# Patient Record
Sex: MALE | Race: ASIAN | NOT HISPANIC OR LATINO
[De-identification: names, ages, dates, MRNs, and addresses within clinical notes are randomized per-mention and may not be internally consistent; named-entity substitution may affect disease eponyms.]

---

## 2022-04-12 ENCOUNTER — APPOINTMENT (OUTPATIENT)
Dept: OTOLARYNGOLOGY | Facility: CLINIC | Age: 67
End: 2022-04-12
Payer: MEDICARE

## 2022-04-12 VITALS
WEIGHT: 166 LBS | DIASTOLIC BLOOD PRESSURE: 85 MMHG | BODY MASS INDEX: 25.16 KG/M2 | SYSTOLIC BLOOD PRESSURE: 157 MMHG | HEART RATE: 74 BPM | TEMPERATURE: 97.8 F | HEIGHT: 68 IN

## 2022-04-12 DIAGNOSIS — Z87.39 PERSONAL HISTORY OF OTHER DISEASES OF THE MUSCULOSKELETAL SYSTEM AND CONNECTIVE TISSUE: ICD-10-CM

## 2022-04-12 DIAGNOSIS — Z86.39 PERSONAL HISTORY OF OTHER ENDOCRINE, NUTRITIONAL AND METABOLIC DISEASE: ICD-10-CM

## 2022-04-12 DIAGNOSIS — H90.3 SENSORINEURAL HEARING LOSS, BILATERAL: ICD-10-CM

## 2022-04-12 DIAGNOSIS — R94.39 ABNORMAL RESULT OF OTHER CARDIOVASCULAR FUNCTION STUDY: ICD-10-CM

## 2022-04-12 PROCEDURE — 92557 COMPREHENSIVE HEARING TEST: CPT

## 2022-04-12 PROCEDURE — 99204 OFFICE O/P NEW MOD 45 MIN: CPT | Mod: 25

## 2022-04-12 PROCEDURE — 31231 NASAL ENDOSCOPY DX: CPT

## 2022-04-12 PROCEDURE — G0268 REMOVAL OF IMPACTED WAX MD: CPT

## 2022-04-12 PROCEDURE — 92567 TYMPANOMETRY: CPT

## 2022-04-12 RX ORDER — BRIMONIDINE TARTRATE, TIMOLOL MALEATE 2; 5 MG/ML; MG/ML
SOLUTION/ DROPS OPHTHALMIC
Refills: 0 | Status: ACTIVE | COMMUNITY

## 2022-04-12 RX ORDER — METFORMIN HYDROCHLORIDE 500 MG/1
500 TABLET, COATED ORAL
Refills: 0 | Status: ACTIVE | COMMUNITY

## 2022-04-12 RX ORDER — ASPIRIN 81 MG
81 TABLET, DELAYED RELEASE (ENTERIC COATED) ORAL
Refills: 0 | Status: ACTIVE | COMMUNITY

## 2022-04-12 RX ORDER — CLOPIDOGREL BISULFATE 75 MG/1
75 TABLET, FILM COATED ORAL
Refills: 0 | Status: ACTIVE | COMMUNITY

## 2022-04-12 RX ORDER — CARVEDILOL 12.5 MG/1
12.5 TABLET, FILM COATED ORAL
Refills: 0 | Status: ACTIVE | COMMUNITY

## 2022-04-12 RX ORDER — ALLOPURINOL 100 MG/1
100 TABLET ORAL
Refills: 0 | Status: ACTIVE | COMMUNITY

## 2022-04-12 RX ORDER — LOSARTAN POTASSIUM 100 MG/1
100 TABLET, FILM COATED ORAL
Refills: 0 | Status: ACTIVE | COMMUNITY

## 2022-04-12 RX ORDER — ATORVASTATIN CALCIUM 40 MG/1
40 TABLET, FILM COATED ORAL
Refills: 0 | Status: ACTIVE | COMMUNITY

## 2022-04-12 NOTE — ASSESSMENT
[FreeTextEntry1] : With a long history of an asymmetrical hearing loss in the right ear had an MRI in the past ruled out any acoustic neuromas.  Comes in because he is clogged bilaterally.  He is diabetic on blood thinners.  On examination massive cerumen impaction curetted out endoscopically as a deviated septum no tumors masses or polyps audiogram confirms asymmetry no further acute interventions indicated do recommend follow-up as needed.

## 2022-04-12 NOTE — HISTORY OF PRESENT ILLNESS
[de-identified] : Patient has a long history of hearing loss in the right ear. he had an MRI done that showed no abnormality as far as he knows. He was told that he has wax in both ears. He does not have any ringing in the ears, dizziness, and no pain. He does not have any issues with nasal congestion or runny nose.

## 2022-04-12 NOTE — END OF VISIT
[FreeTextEntry3] : I saw and examined this patient in person. I have discussed with Elham Meza, Physician Assistant, in detail the above note and agree with the above assessment and plan of care.\par

## 2023-04-27 ENCOUNTER — APPOINTMENT (OUTPATIENT)
Dept: OTOLARYNGOLOGY | Facility: CLINIC | Age: 68
End: 2023-04-27
Payer: MEDICARE

## 2023-04-27 DIAGNOSIS — H61.23 IMPACTED CERUMEN, BILATERAL: ICD-10-CM

## 2023-04-27 DIAGNOSIS — J34.2 DEVIATED NASAL SEPTUM: ICD-10-CM

## 2023-04-27 DIAGNOSIS — H90.3 SENSORINEURAL HEARING LOSS, BILATERAL: ICD-10-CM

## 2023-04-27 PROCEDURE — 92567 TYMPANOMETRY: CPT

## 2023-04-27 PROCEDURE — 92557 COMPREHENSIVE HEARING TEST: CPT

## 2023-04-27 PROCEDURE — G0268 REMOVAL OF IMPACTED WAX MD: CPT

## 2023-04-27 PROCEDURE — 99214 OFFICE O/P EST MOD 30 MIN: CPT | Mod: 25

## 2023-04-27 NOTE — HISTORY OF PRESENT ILLNESS
[de-identified] : patient has a known history of a right ear hearing loss, comes in for another ear check. He does not have any noted changes in hearing or ringing in the ears. He denies dizziness. He does not have any acute issues with nasal congestion and runny nose. He denies seasonal allergies this time of y ear \par \par He has had an MRI in the past for the asymmetrical hearing loss that was normal

## 2023-04-27 NOTE — PHYSICAL EXAM
[Midline] : trachea located in midline position [Normal] : no rashes [de-identified] : cerumen in the ear canals; once removed normal EACs

## 2023-04-27 NOTE — END OF VISIT
[FreeTextEntry3] : I, Dr. Girard personally performed the evaluation and management (E/M) services , including all procedures, for this established patient who presents today with (a) new problem(s)/exacerbation of (an) existing condition(s). That E/M includes conducting the clinically appropriate interval history &/or exam, assessing all new/exacerbated conditions, and establishing a new plan of care. Today, my SHY, Elham Meza, was here to observe &/or participate in the visit & follow plan of care established by me.\par \par \par

## 2023-04-27 NOTE — ASSESSMENT
[FreeTextEntry1] : History of asymmetrical hearing loss had an MRI comes in for cerumen management significant amount of wax curetted out bilaterally audiogram was performed everything seems to be stable we will follow-up annually.  Audiogram no change

## 2024-05-09 ENCOUNTER — INPATIENT (INPATIENT)
Facility: HOSPITAL | Age: 69
LOS: 0 days | Discharge: HOME | End: 2024-05-10
Attending: INTERNAL MEDICINE | Admitting: INTERNAL MEDICINE
Payer: MEDICARE

## 2024-05-09 VITALS
RESPIRATION RATE: 18 BRPM | SYSTOLIC BLOOD PRESSURE: 154 MMHG | TEMPERATURE: 98 F | OXYGEN SATURATION: 98 % | HEART RATE: 69 BPM | DIASTOLIC BLOOD PRESSURE: 67 MMHG

## 2024-05-09 DIAGNOSIS — R07.9 CHEST PAIN, UNSPECIFIED: ICD-10-CM

## 2024-05-09 DIAGNOSIS — N17.9 ACUTE KIDNEY FAILURE, UNSPECIFIED: ICD-10-CM

## 2024-05-09 DIAGNOSIS — I10 ESSENTIAL (PRIMARY) HYPERTENSION: ICD-10-CM

## 2024-05-09 DIAGNOSIS — Z29.9 ENCOUNTER FOR PROPHYLACTIC MEASURES, UNSPECIFIED: ICD-10-CM

## 2024-05-09 DIAGNOSIS — E11.9 TYPE 2 DIABETES MELLITUS WITHOUT COMPLICATIONS: ICD-10-CM

## 2024-05-09 DIAGNOSIS — Z90.5 ACQUIRED ABSENCE OF KIDNEY: Chronic | ICD-10-CM

## 2024-05-09 LAB
ALBUMIN SERPL ELPH-MCNC: 4.2 G/DL — SIGNIFICANT CHANGE UP (ref 3.3–5)
ALP SERPL-CCNC: 98 U/L — SIGNIFICANT CHANGE UP (ref 40–120)
ALT FLD-CCNC: 17 U/L — SIGNIFICANT CHANGE UP (ref 4–41)
ANION GAP SERPL CALC-SCNC: 15 MMOL/L — HIGH (ref 7–14)
APTT BLD: 33.9 SEC — SIGNIFICANT CHANGE UP (ref 24.5–35.6)
AST SERPL-CCNC: 23 U/L — SIGNIFICANT CHANGE UP (ref 4–40)
BASOPHILS # BLD AUTO: 0.06 K/UL — SIGNIFICANT CHANGE UP (ref 0–0.2)
BASOPHILS NFR BLD AUTO: 1.1 % — SIGNIFICANT CHANGE UP (ref 0–2)
BILIRUB SERPL-MCNC: 0.9 MG/DL — SIGNIFICANT CHANGE UP (ref 0.2–1.2)
BUN SERPL-MCNC: 45 MG/DL — HIGH (ref 7–23)
CALCIUM SERPL-MCNC: 9.1 MG/DL — SIGNIFICANT CHANGE UP (ref 8.4–10.5)
CHLORIDE SERPL-SCNC: 99 MMOL/L — SIGNIFICANT CHANGE UP (ref 98–107)
CO2 SERPL-SCNC: 22 MMOL/L — SIGNIFICANT CHANGE UP (ref 22–31)
CREAT SERPL-MCNC: 1.96 MG/DL — HIGH (ref 0.5–1.3)
EGFR: 36 ML/MIN/1.73M2 — LOW
EOSINOPHIL # BLD AUTO: 0.33 K/UL — SIGNIFICANT CHANGE UP (ref 0–0.5)
EOSINOPHIL NFR BLD AUTO: 5.9 % — SIGNIFICANT CHANGE UP (ref 0–6)
GLUCOSE BLDC GLUCOMTR-MCNC: 109 MG/DL — HIGH (ref 70–99)
GLUCOSE BLDC GLUCOMTR-MCNC: 132 MG/DL — HIGH (ref 70–99)
GLUCOSE SERPL-MCNC: 133 MG/DL — HIGH (ref 70–99)
HCT VFR BLD CALC: 46.6 % — SIGNIFICANT CHANGE UP (ref 39–50)
HGB BLD-MCNC: 15 G/DL — SIGNIFICANT CHANGE UP (ref 13–17)
IANC: 2.95 K/UL — SIGNIFICANT CHANGE UP (ref 1.8–7.4)
IMM GRANULOCYTES NFR BLD AUTO: 0.2 % — SIGNIFICANT CHANGE UP (ref 0–0.9)
INR BLD: 0.97 RATIO — SIGNIFICANT CHANGE UP (ref 0.85–1.18)
LYMPHOCYTES # BLD AUTO: 1.78 K/UL — SIGNIFICANT CHANGE UP (ref 1–3.3)
LYMPHOCYTES # BLD AUTO: 31.6 % — SIGNIFICANT CHANGE UP (ref 13–44)
MCHC RBC-ENTMCNC: 26.9 PG — LOW (ref 27–34)
MCHC RBC-ENTMCNC: 32.2 GM/DL — SIGNIFICANT CHANGE UP (ref 32–36)
MCV RBC AUTO: 83.7 FL — SIGNIFICANT CHANGE UP (ref 80–100)
MONOCYTES # BLD AUTO: 0.5 K/UL — SIGNIFICANT CHANGE UP (ref 0–0.9)
MONOCYTES NFR BLD AUTO: 8.9 % — SIGNIFICANT CHANGE UP (ref 2–14)
NEUTROPHILS # BLD AUTO: 2.95 K/UL — SIGNIFICANT CHANGE UP (ref 1.8–7.4)
NEUTROPHILS NFR BLD AUTO: 52.3 % — SIGNIFICANT CHANGE UP (ref 43–77)
NRBC # BLD: 0 /100 WBCS — SIGNIFICANT CHANGE UP (ref 0–0)
NRBC # FLD: 0 K/UL — SIGNIFICANT CHANGE UP (ref 0–0)
PLATELET # BLD AUTO: 171 K/UL — SIGNIFICANT CHANGE UP (ref 150–400)
POTASSIUM SERPL-MCNC: 5.1 MMOL/L — SIGNIFICANT CHANGE UP (ref 3.5–5.3)
POTASSIUM SERPL-SCNC: 5.1 MMOL/L — SIGNIFICANT CHANGE UP (ref 3.5–5.3)
PROT SERPL-MCNC: 7.6 G/DL — SIGNIFICANT CHANGE UP (ref 6–8.3)
PROTHROM AB SERPL-ACNC: 10.9 SEC — SIGNIFICANT CHANGE UP (ref 9.5–13)
RBC # BLD: 5.57 M/UL — SIGNIFICANT CHANGE UP (ref 4.2–5.8)
RBC # FLD: 14.5 % — SIGNIFICANT CHANGE UP (ref 10.3–14.5)
SODIUM SERPL-SCNC: 136 MMOL/L — SIGNIFICANT CHANGE UP (ref 135–145)
TROPONIN T, HIGH SENSITIVITY RESULT: 13 NG/L — SIGNIFICANT CHANGE UP
TROPONIN T, HIGH SENSITIVITY RESULT: 14 NG/L — SIGNIFICANT CHANGE UP
WBC # BLD: 5.63 K/UL — SIGNIFICANT CHANGE UP (ref 3.8–10.5)
WBC # FLD AUTO: 5.63 K/UL — SIGNIFICANT CHANGE UP (ref 3.8–10.5)

## 2024-05-09 PROCEDURE — 99285 EMERGENCY DEPT VISIT HI MDM: CPT

## 2024-05-09 PROCEDURE — 99223 1ST HOSP IP/OBS HIGH 75: CPT

## 2024-05-09 PROCEDURE — 71046 X-RAY EXAM CHEST 2 VIEWS: CPT | Mod: 26

## 2024-05-09 RX ORDER — INSULIN LISPRO 100/ML
VIAL (ML) SUBCUTANEOUS AT BEDTIME
Refills: 0 | Status: DISCONTINUED | OUTPATIENT
Start: 2024-05-09 | End: 2024-05-10

## 2024-05-09 RX ORDER — ACETAMINOPHEN 500 MG
650 TABLET ORAL EVERY 6 HOURS
Refills: 0 | Status: DISCONTINUED | OUTPATIENT
Start: 2024-05-09 | End: 2024-05-10

## 2024-05-09 RX ORDER — HEPARIN SODIUM 5000 [USP'U]/ML
5000 INJECTION INTRAVENOUS; SUBCUTANEOUS EVERY 12 HOURS
Refills: 0 | Status: DISCONTINUED | OUTPATIENT
Start: 2024-05-09 | End: 2024-05-10

## 2024-05-09 RX ORDER — ASPIRIN/CALCIUM CARB/MAGNESIUM 324 MG
1 TABLET ORAL
Refills: 0 | DISCHARGE

## 2024-05-09 RX ORDER — EMPAGLIFLOZIN 10 MG/1
1 TABLET, FILM COATED ORAL
Refills: 0 | DISCHARGE

## 2024-05-09 RX ORDER — GLUCAGON INJECTION, SOLUTION 0.5 MG/.1ML
1 INJECTION, SOLUTION SUBCUTANEOUS ONCE
Refills: 0 | Status: DISCONTINUED | OUTPATIENT
Start: 2024-05-09 | End: 2024-05-10

## 2024-05-09 RX ORDER — ASPIRIN/CALCIUM CARB/MAGNESIUM 324 MG
162 TABLET ORAL ONCE
Refills: 0 | Status: COMPLETED | OUTPATIENT
Start: 2024-05-09 | End: 2024-05-09

## 2024-05-09 RX ORDER — SODIUM CHLORIDE 9 MG/ML
1000 INJECTION, SOLUTION INTRAVENOUS
Refills: 0 | Status: DISCONTINUED | OUTPATIENT
Start: 2024-05-09 | End: 2024-05-10

## 2024-05-09 RX ORDER — FINERENONE 20 MG/1
1 TABLET, FILM COATED ORAL
Refills: 0 | DISCHARGE

## 2024-05-09 RX ORDER — ALLOPURINOL 300 MG
100 TABLET ORAL DAILY
Refills: 0 | Status: DISCONTINUED | OUTPATIENT
Start: 2024-05-09 | End: 2024-05-10

## 2024-05-09 RX ORDER — LOSARTAN POTASSIUM 100 MG/1
1 TABLET, FILM COATED ORAL
Refills: 0 | DISCHARGE

## 2024-05-09 RX ORDER — DEXTROSE 10 % IN WATER 10 %
125 INTRAVENOUS SOLUTION INTRAVENOUS ONCE
Refills: 0 | Status: DISCONTINUED | OUTPATIENT
Start: 2024-05-09 | End: 2024-05-10

## 2024-05-09 RX ORDER — LANOLIN ALCOHOL/MO/W.PET/CERES
3 CREAM (GRAM) TOPICAL AT BEDTIME
Refills: 0 | Status: DISCONTINUED | OUTPATIENT
Start: 2024-05-09 | End: 2024-05-10

## 2024-05-09 RX ORDER — DEXTROSE 50 % IN WATER 50 %
25 SYRINGE (ML) INTRAVENOUS ONCE
Refills: 0 | Status: DISCONTINUED | OUTPATIENT
Start: 2024-05-09 | End: 2024-05-10

## 2024-05-09 RX ORDER — ALLOPURINOL 300 MG
1 TABLET ORAL
Refills: 0 | DISCHARGE

## 2024-05-09 RX ORDER — TIMOLOL 0.5 %
1 DROPS OPHTHALMIC (EYE)
Refills: 0 | DISCHARGE

## 2024-05-09 RX ORDER — ASPIRIN/CALCIUM CARB/MAGNESIUM 324 MG
81 TABLET ORAL DAILY
Refills: 0 | Status: DISCONTINUED | OUTPATIENT
Start: 2024-05-09 | End: 2024-05-10

## 2024-05-09 RX ORDER — CARVEDILOL PHOSPHATE 80 MG/1
6.25 CAPSULE, EXTENDED RELEASE ORAL EVERY 12 HOURS
Refills: 0 | Status: DISCONTINUED | OUTPATIENT
Start: 2024-05-09 | End: 2024-05-10

## 2024-05-09 RX ORDER — CLOPIDOGREL BISULFATE 75 MG/1
75 TABLET, FILM COATED ORAL DAILY
Refills: 0 | Status: DISCONTINUED | OUTPATIENT
Start: 2024-05-09 | End: 2024-05-10

## 2024-05-09 RX ORDER — INSULIN LISPRO 100/ML
VIAL (ML) SUBCUTANEOUS
Refills: 0 | Status: DISCONTINUED | OUTPATIENT
Start: 2024-05-09 | End: 2024-05-10

## 2024-05-09 RX ORDER — BRIMONIDINE TARTRATE 2 MG/MG
1 SOLUTION/ DROPS OPHTHALMIC
Refills: 0 | DISCHARGE

## 2024-05-09 RX ORDER — CARVEDILOL PHOSPHATE 80 MG/1
1 CAPSULE, EXTENDED RELEASE ORAL
Refills: 0 | DISCHARGE

## 2024-05-09 RX ORDER — DULAGLUTIDE 4.5 MG/.5ML
1.5 INJECTION, SOLUTION SUBCUTANEOUS
Refills: 0 | DISCHARGE

## 2024-05-09 RX ORDER — DEXTROSE 50 % IN WATER 50 %
12.5 SYRINGE (ML) INTRAVENOUS ONCE
Refills: 0 | Status: DISCONTINUED | OUTPATIENT
Start: 2024-05-09 | End: 2024-05-10

## 2024-05-09 RX ORDER — ATORVASTATIN CALCIUM 80 MG/1
1 TABLET, FILM COATED ORAL
Refills: 0 | DISCHARGE

## 2024-05-09 RX ORDER — SODIUM ZIRCONIUM CYCLOSILICATE 10 G/10G
10 POWDER, FOR SUSPENSION ORAL
Refills: 0 | DISCHARGE

## 2024-05-09 RX ORDER — ONDANSETRON 8 MG/1
4 TABLET, FILM COATED ORAL EVERY 8 HOURS
Refills: 0 | Status: DISCONTINUED | OUTPATIENT
Start: 2024-05-09 | End: 2024-05-10

## 2024-05-09 RX ORDER — ATORVASTATIN CALCIUM 80 MG/1
40 TABLET, FILM COATED ORAL AT BEDTIME
Refills: 0 | Status: DISCONTINUED | OUTPATIENT
Start: 2024-05-09 | End: 2024-05-10

## 2024-05-09 RX ORDER — DEXTROSE 50 % IN WATER 50 %
15 SYRINGE (ML) INTRAVENOUS ONCE
Refills: 0 | Status: DISCONTINUED | OUTPATIENT
Start: 2024-05-09 | End: 2024-05-10

## 2024-05-09 RX ADMIN — Medication 162 MILLIGRAM(S): at 17:38

## 2024-05-09 RX ADMIN — ATORVASTATIN CALCIUM 40 MILLIGRAM(S): 80 TABLET, FILM COATED ORAL at 21:09

## 2024-05-09 NOTE — H&P ADULT - NSICDXPASTMEDICALHX_GEN_ALL_CORE_FT
PAST MEDICAL HISTORY:  Chronic kidney disease, unspecified CKD stage     DM (diabetes mellitus)     HLD (hyperlipidemia)     HTN (hypertension)

## 2024-05-09 NOTE — ED PROVIDER NOTE - CLINICAL SUMMARY MEDICAL DECISION MAKING FREE TEXT BOX
NAYLA Loomis PGY1- patient here with L chest pain, intermittent for past 2 weeks, worse today after taking a walk, also with increased weakness. Mildly tender to palpation over L upper chest wall. Story concerning for unstable angina, will obtain EKG, labs, troponin, CXR. Anticipate admit vs CDU.

## 2024-05-09 NOTE — H&P ADULT - HISTORY OF PRESENT ILLNESS
chest pain 68 yo M with Pmhx of R renal mass s/p nephrectomy, HTN, HLD, CKD, and DMII presents to the ED with chest pain. The pain started 2 weeks ago. It is localized on the left side. It is intermittent and nonradiating. It is not associated with exertion. He denies any fever, chills, nausea, vomiting, abdominal pain, or diarrhea.  In the ED, his vitals were notable for HTN. Labs were notable for GINA. EKG showed NSR. CXR showed clear lungs.

## 2024-05-09 NOTE — H&P ADULT - PROBLEM SELECTOR PLAN 2
GINA on CKD   -Baseline scr is around 1.5   -Hold losartan and kerendia for now   -Renal consulted, recs appreciated

## 2024-05-09 NOTE — ED ADULT TRIAGE NOTE - CHIEF COMPLAINT QUOTE
Pt presents to ED ambulatory from home with c/o L sided chest pain x 2 weeks. Pt reports pain worse after walking today. Pt reports hx of DM2, HLD, HTN.

## 2024-05-09 NOTE — H&P ADULT - NSHPREVIEWOFSYSTEMS_GEN_ALL_CORE
REVIEW OF SYSTEMS:    CONSTITUTIONAL: No weakness, fevers or chills  EYES/ENT: No visual changes;  No vertigo or throat pain   NECK: No pain or stiffness  RESPIRATORY: No cough, wheezing, hemoptysis; No shortness of breath  CARDIOVASCULAR: +chest pain  GASTROINTESTINAL: No abdominal or epigastric pain. No nausea, vomiting, or hematemesis; No diarrhea or constipation. No melena or hematochezia.  GENITOURINARY: No dysuria, frequency or hematuria  NEUROLOGICAL: No numbness or weakness  MUSCULOSKELETAL: No joint pain, no muscle ache   SKIN: No itching, burning, rashes, or lesions   All other review of systems is negative unless indicated above.

## 2024-05-09 NOTE — H&P ADULT - PROBLEM SELECTOR PLAN 1
Patient with intermittent episode of left sided chest pain   -EKG showed NSR. Trops stable, ACS is ruled out   -F/u with TTE  -Cardiology consulted, f/u with recs   -NST inpatient vs. outpatient   -C/w telemonitoring  -C/w aspirin and plavix

## 2024-05-09 NOTE — ED ADULT NURSE NOTE - OBJECTIVE STATEMENT
presents to ED for midsternal cp x 1 day. denies sob/fever/chills. denies n/v/d/headache. RR even and unlabored, speaking in complete sentences. no drooling noted. well appearing, ambulatory.  MD bedside eval now. will cont to monitor.

## 2024-05-09 NOTE — H&P ADULT - ASSESSMENT
68 yo M with Pmhx of R renal mass s/p nephrectomy, HTN, HLD, CKD, and DMII presents to the ED with chest pain

## 2024-05-09 NOTE — ED PROVIDER NOTE - ATTENDING CONTRIBUTION TO CARE
70yo M Diabetes type 2 CKD status post nephrectomy hyperlipidemia, hypertension presents with 2 weeks of intermittent exertional chest pain around that and improves when he sits down.  He gets lightheaded and dizzy during these episodes as well.  He currently does not have any pain but his pain was worse today.  Patient was seen by his cardiologist Dr. Malin at [facility] yesterday and told him about his need the doctor scheduled the stress and echo for July and in the meantime increase his Coreg dosing.  Patient well-appearing EKG without acute ischemic changes we will rule out ACS will admit to telemetry for possible unstable angina and further eval.

## 2024-05-09 NOTE — ED PROVIDER NOTE - PHYSICAL EXAMINATION
General: no acute distress  Psych: mood appropriate  Head: normocephalic; atraumatic  Eyes: conjunctivae clear bilaterally, sclerae anicteric  ENT: no nasal flaring, patent nares  Cardio: regular rate and rhythm; normal heart sounds  Resp: clear to auscultation bilaterally  GI: abdomen soft, nontender, nondistended  Neuro: A&Ox3  Skin: no rashes or bruising noted  MSK: normal movement of extremities; L upper chest wall mildly tender to palpation  Lymph/Vasc: no LE edema

## 2024-05-09 NOTE — ED ADULT NURSE NOTE - NSFALLUNIVINTERV_ED_ALL_ED
Bed/Stretcher in lowest position, wheels locked, appropriate side rails in place/Call bell, personal items and telephone in reach/Instruct patient to call for assistance before getting out of bed/chair/stretcher/Non-slip footwear applied when patient is off stretcher/[place] to call system/Physically safe environment - no spills, clutter or unnecessary equipment/Purposeful proactive rounding/Room/bathroom lighting operational, light cord in reach

## 2024-05-09 NOTE — H&P ADULT - NSHPLABSRESULTS_GEN_ALL_CORE
15.0   5.63  )-----------( 171      ( 09 May 2024 13:30 )             46.6     Hgb Trend: 15.0<--  05-09    136  |  99  |  45<H>  ----------------------------<  133<H>  5.1   |  22  |  1.96<H>    Ca    9.1      09 May 2024 13:30    TPro  7.6  /  Alb  4.2  /  TBili  0.9  /  DBili  x   /  AST  23  /  ALT  17  /  AlkPhos  98  05-09    Creatinine Trend: 1.96<--  PT/INR - ( 09 May 2024 13:30 )   PT: 10.9 sec;   INR: 0.97 ratio         PTT - ( 09 May 2024 13:30 )  PTT:33.9 sec      Urinalysis Basic - ( 09 May 2024 13:30 )    Color: x / Appearance: x / SG: x / pH: x  Gluc: 133 mg/dL / Ketone: x  / Bili: x / Urobili: x   Blood: x / Protein: x / Nitrite: x   Leuk Esterase: x / RBC: x / WBC x   Sq Epi: x / Non Sq Epi: x / Bacteria: x        Labs were reviewed by me and they were notable for GINA on CKD.

## 2024-05-09 NOTE — CONSULT NOTE ADULT - SUBJECTIVE AND OBJECTIVE BOX
HPI: Mr. Reyna is a 69 year-old man with history of multiple medical issues including hypertension, type 2 diabetes mellitus, and chronic kidney disease. He presented today to the [facility] ER with intermittent left-sided chest pain for 2 weeks. He states that the chest pain is triggered/exacerbated by deep inspiration, and that it often lasts ~5minutes. Today while going for a walk, he had the same pain but it was more intense than usual and was associated with generalized weakness. He is known to me from the office setting.    hronic kidney disease, stage 3a - N18.31  Early diabetic nephropathy - on ARB; not tolerating SPS of late; therefore unable to take Kerendia due to hyperkalemia risk.    Hyperkalemia - E87.5  Unable to tolerate SPS powder due to constipation. Was he mixing the powder properly with water? States that some of the powder did not dissolve. Maybe he would have better luck with a pre-made SPS suspension. If this does not work, he is not interested in attempting Lokelma or Veltassa. This is not unreasonable; if SPS is causing severe constipation, there is every reason to believe that Lokelma and Veltassa would as well...and additionally, they are not covered by his insurance.                   Plan:    (1)Will attempt to prescribe SPS as a pre-made suspension, rather than a powder, with hope that he tolerates this better than the powder. Advised that if he tolerates it, to take it BIW on Tuesdays/Thursdays as he was doing in the past, and to take the Kerendia MWF. If unable to tolerate this version of SPS, then he can forgo both SPS and Kerendia from this point forward.        (2)Will follow up pending bloodwork/urine studies and further adjust plan of care as needed        (3)F/U 3months  PAST MEDICAL & SURGICAL HISTORY:  HTN  HLD  DM2  CKD    Allergies  No Known Allergies    SOCIAL HISTORY:  Denies ETOh,Smoking,     FAMILY HISTORY:  No CKD    REVIEW OF SYSTEMS:  CONSTITUTIONAL: No weakness, fevers or chills  EYES/ENT: No visual changes;  No vertigo or throat pain   NECK: No pain or stiffness  RESPIRATORY: No cough, wheezing, hemoptysis; No shortness of breath  CARDIOVASCULAR: No chest pain or palpitations  GASTROINTESTINAL: No abdominal or epigastric pain. No nausea, vomiting, or hematemesis; No diarrhea or constipation. No melena or hematochezia.  GENITOURINARY: No dysuria, frequency or hematuria  NEUROLOGICAL: No numbness or weakness  SKIN: No itching, burning, rashes, or lesions   All other review of systems is negative unless indicated above.    VITAL:  T(C): , Max: 36.6 (05-09-24 @ 12:18)  T(F): , Max: 97.8 (05-09-24 @ 12:18)  HR: 69 (05-09-24 @ 12:18)  BP: 154/67 (05-09-24 @ 12:18)  RR: 18 (05-09-24 @ 12:18)  SpO2: 98% (05-09-24 @ 12:18)    PHYSICAL EXAM:  Constitutional: NAD, Alert  HEENT: NCAT, MMM  Neck: Supple, No JVD  Respiratory: CTA-b/l  Cardiovascular: RRR s1s2, no m/r/g  Gastrointestinal: BS+, soft, NT/ND  Extremities: No peripheral edema b/l  Neurological: no focal deficits; strength grossly intact  Back: no CVAT b/l  Skin: No rashes, no nevi    LABS:                        15.0   5.63  )-----------( 171      ( 09 May 2024 13:30 )             46.6     Na(136)/K(5.1)/Cl(99)/HCO3(22)/BUN(45)/Cr(1.96)Glu(133)/Ca(9.1)/Mg(--)/PO4(--)    05-09 @ 13:30    (4/12/23) - BUN 32, Cr 1.56, K 5.4, HCO3 26, Ca 9.5, A1c 6.8, (1/4/23) - BUN 30, Cr 1.54, K 5.1, HCO3 24, Ca 9.6, Hb 13.9  (1/3/22) - BUN 29, Cr 1.28, K 4.8, HCO3 27, Ca 9.2, Hb 14   (1/6/21) - BUN 30, Cr 1.28, K 4.7, HCO3 25, Hb 14.4, TSat 40, Ferritin 36, Alb 4.1, Uric 6.5, PO4 3.1  (2/7/20) - BUN 23, Cr 1.45, K 4.5, HCO3 29, A1c 7.8  (3/8/18) - BUN 23, Cr 1.40, K 4.8, CO2 25    (4/12/23) - Up/c 2.95g/g; UA-2+prot, no blood  (9/19/19) - Up/c 2.57g/g;    IMAGING:  < from: Xray Chest 2 Views PA/Lat (05.09.24 @ 14:01) >  No evidence of acute cardiopulmonary disease.      ASSESSMENT:  (1)Renal - CKD3b with near-nephrotic range proteinuria - diabetic nephropathy - the rise in creatinine from 1.56 to 1.96 over the past 13 months is due to progression of his CKD  (2)Lytes - grossly acceptable at present  (3)CV - mildly hypertensive  (4)Chest pain - unclear exact etiology    RECOMMEND:  (1)Ischemic workup per Cardiology - if to require cath, would give isotonic IVF periprocedurally (NS 100cc/h x 5h starting ~1h prior to cath would be reasonable)  (2)Dose new meds for GFR 30-40ml/min    Thank you for involving [facility] in this patient's care.    With warm regards,    Jay Jay Ruiz MD   [facility]  Office: [phone]  Cell: [phone]             HPI: Mr. Reyna is a 69 year-old man with history of multiple medical issues including hypertension, type 2 diabetes mellitus, and chronic kidney disease. He presented today to the [facility] ER with intermittent left-sided chest pain for 2 weeks. He states that the chest pain is triggered/exacerbated by deep inspiration, and that it often lasts ~5minutes. Today while going for a walk, he had the same pain but it was more intense than usual and was associated with generalized weakness. He is known to me from the office setting. I last saw him in 5/2023. At that time, I placed him on Kerendia; he remains on the medication. He shares that since he last saw me, he was placed back on an SGLT2i. I had placed him on one a few years back; he ended up stopping it after developing a UTI. He has not suffered any UTIs since being placed back on SGLT2i therapy.    History provided by patient and wife (bedside). They share that within the past couple of months, he was placed on Trulicity. Soon after the initiation of the medication, he started to experience episodic a.m. hypotension with SBP 90s; prior to initiation of Trulicity, his a.m. BP would usually be in the 130s. In light of this, his Losartan dosage was reduced of late from 100mg qhs to 50mg qhs. He is also on Coreg; the dose has not been adjusted of late. He shares that he last underwent a stress test in 11/2023 and he believes it returned as normal.    PAST MEDICAL & SURGICAL HISTORY:  HTN  HLD  DM2  CKD    Allergies  No Known Allergies    SOCIAL HISTORY:  Denies ETOh,Smoking,     FAMILY HISTORY:  No CKD    REVIEW OF SYSTEMS:  CONSTITUTIONAL: (+)generalized weakness  EYES/ENT: No visual changes;  No vertigo or throat pain   NECK: No pain or stiffness  RESPIRATORY: No cough, wheezing, hemoptysis; No shortness of breath  CARDIOVASCULAR: (+)chest pain  GASTROINTESTINAL: No abdominal or epigastric pain. No nausea, vomiting, or hematemesis; No diarrhea or constipation. No melena or hematochezia.  GENITOURINARY: No dysuria, frequency or hematuria  NEUROLOGICAL: No numbness or weakness  SKIN: No itching, burning, rashes, or lesions   All other review of systems is negative unless indicated above.    VITAL:  T(C): , Max: 36.6 (05-09-24 @ 12:18)  T(F): , Max: 97.8 (05-09-24 @ 12:18)  HR: 69 (05-09-24 @ 12:18)  BP: 154/67 (05-09-24 @ 12:18)  RR: 18 (05-09-24 @ 12:18)  SpO2: 98% (05-09-24 @ 12:18)    PHYSICAL EXAM:  Constitutional: NAD, Alert  HEENT: NCAT, DMM  Neck: Supple, No JVD  Respiratory: CTA-b/l  Cardiovascular: RRR s1s2, no m/r/g  Gastrointestinal: BS+, soft, NT/ND  Extremities: No peripheral edema b/l  Neurological: no focal deficits; strength grossly intact  Back: no CVAT b/l  Skin: No rashes, no nevi    LABS:                        15.0   5.63  )-----------( 171      ( 09 May 2024 13:30 )             46.6     Na(136)/K(5.1)/Cl(99)/HCO3(22)/BUN(45)/Cr(1.96)Glu(133)/Ca(9.1)/Mg(--)/PO4(--)    05-09 @ 13:30    (4/12/23) - BUN 32, Cr 1.56, K 5.4, HCO3 26, Ca 9.5, A1c 6.8, (1/4/23) - BUN 30, Cr 1.54, K 5.1, HCO3 24, Ca 9.6, Hb 13.9  (1/3/22) - BUN 29, Cr 1.28, K 4.8, HCO3 27, Ca 9.2, Hb 14   (1/6/21) - BUN 30, Cr 1.28, K 4.7, HCO3 25, Hb 14.4, TSat 40, Ferritin 36, Alb 4.1, Uric 6.5, PO4 3.1  (2/7/20) - BUN 23, Cr 1.45, K 4.5, HCO3 29, A1c 7.8  (3/8/18) - BUN 23, Cr 1.40, K 4.8, CO2 25    (4/12/23) - Up/c 2.95g/g; UA-2+prot, no blood  (9/19/19) - Up/c 2.57g/g;    IMAGING:  < from: Xray Chest 2 Views PA/Lat (05.09.24 @ 14:01) >  No evidence of acute cardiopulmonary disease.      ASSESSMENT:  (1)Renal - CKD3b with near-nephrotic range proteinuria - diabetic nephropathy - the rise in creatinine from 1.56 to 1.96 over the past 13 months is due to progression of his CKD +/- component of prerenal azotemia in setting of uptitration of RAAS inhibition/SGLT2 inhibition.    (2)Lytes - grossly acceptable at present    (3)CV - mildly hypertensive    (4)Chest pain - unclear exact etiology    RECOMMEND:  (1)Ischemic workup per Cardiology - if to require cath, would give isotonic IVF periprocedurally (NS 100cc/h x 5h starting ~1h prior to cath would be reasonable)  (2)Can continue Losartan and his SGLT2i as taken at home; can hold Kerendia for now with plan to restart it upon discharge  (3)Close hemodynamic monitoring/orthostatics q12h  (4)Dose new meds for GFR 30-40ml/min    Thank you for involving [facility] in this patient's care.    With warm regards,    Jay Jay Ruiz MD   [facility]  Office: [phone]  Cell: [phone]

## 2024-05-09 NOTE — H&P ADULT - NSHPPHYSICALEXAM_GEN_ALL_CORE
Vital Signs Last 24 Hrs  T(C): 36.4 (09 May 2024 16:50), Max: 36.6 (09 May 2024 12:18)  T(F): 97.6 (09 May 2024 16:50), Max: 97.8 (09 May 2024 12:18)  HR: 61 (09 May 2024 16:50) (61 - 69)  BP: 149/87 (09 May 2024 16:50) (149/87 - 154/67)  BP(mean): --  RR: 18 (09 May 2024 16:50) (18 - 18)  SpO2: 100% (09 May 2024 16:50) (98% - 100%)    Parameters below as of 09 May 2024 16:50  Patient On (Oxygen Delivery Method): room air    GENERAL: NAD, well-developed  HEENT:  Atraumatic, Normocephalic, EOMI, PERRLA, conjunctiva and sclera clear, oral mucosa moist, clear w/o any exudate   NECK: Supple, No JVD  CHEST/LUNG: Clear to auscultation bilaterally; No wheeze  HEART: Regular rate and rhythm; No murmurs, rubs, or gallops  ABDOMEN: Soft, Nontender, Nondistended; Bowel sounds present  EXTREMITIES:  2+ Peripheral Pulses, No clubbing, cyanosis, or edema  PSYCH: AAOx3  NEUROLOGY: non-focal, moving all extremities   SKIN: No rashes or lesions

## 2024-05-09 NOTE — ED PROVIDER NOTE - OBJECTIVE STATEMENT
The patient is a 70 y/o M with past medical history of HTN, CAD, HLD presenting with intermittent L sided chest pain for the past 2 weeks. Pain occurring randomly, worse with inspiration, usually lasting approx. 5 minutes. Patient saw his cardiologist yesterday, who recommended getting stress and echo, scheduled several weeks from now. Patient states today after going for a walk had the same pain but was more intense than usual and felt weak. No fever, dyspnea, abdominal pain, nausea, vomiting, diarrhea, syncope, headache, vision changes, or any other symptoms.

## 2024-05-09 NOTE — PATIENT PROFILE ADULT - FALL HARM RISK - HARM RISK INTERVENTIONS

## 2024-05-10 ENCOUNTER — TRANSCRIPTION ENCOUNTER (OUTPATIENT)
Age: 69
End: 2024-05-10

## 2024-05-10 VITALS
RESPIRATION RATE: 18 BRPM | DIASTOLIC BLOOD PRESSURE: 79 MMHG | HEART RATE: 80 BPM | SYSTOLIC BLOOD PRESSURE: 134 MMHG | TEMPERATURE: 98 F | OXYGEN SATURATION: 97 %

## 2024-05-10 LAB
A1C WITH ESTIMATED AVERAGE GLUCOSE RESULT: 7.5 % — HIGH (ref 4–5.6)
ALBUMIN SERPL ELPH-MCNC: 4.1 G/DL — SIGNIFICANT CHANGE UP (ref 3.3–5)
ALP SERPL-CCNC: 97 U/L — SIGNIFICANT CHANGE UP (ref 40–120)
ALT FLD-CCNC: 17 U/L — SIGNIFICANT CHANGE UP (ref 4–41)
ANION GAP SERPL CALC-SCNC: 13 MMOL/L — SIGNIFICANT CHANGE UP (ref 7–14)
AST SERPL-CCNC: 19 U/L — SIGNIFICANT CHANGE UP (ref 4–40)
BASOPHILS # BLD AUTO: 0.05 K/UL — SIGNIFICANT CHANGE UP (ref 0–0.2)
BASOPHILS NFR BLD AUTO: 0.7 % — SIGNIFICANT CHANGE UP (ref 0–2)
BILIRUB SERPL-MCNC: 0.9 MG/DL — SIGNIFICANT CHANGE UP (ref 0.2–1.2)
BUN SERPL-MCNC: 44 MG/DL — HIGH (ref 7–23)
CALCIUM SERPL-MCNC: 9.3 MG/DL — SIGNIFICANT CHANGE UP (ref 8.4–10.5)
CHLORIDE SERPL-SCNC: 100 MMOL/L — SIGNIFICANT CHANGE UP (ref 98–107)
CHOLEST SERPL-MCNC: 121 MG/DL — SIGNIFICANT CHANGE UP
CO2 SERPL-SCNC: 23 MMOL/L — SIGNIFICANT CHANGE UP (ref 22–31)
CREAT SERPL-MCNC: 1.87 MG/DL — HIGH (ref 0.5–1.3)
EGFR: 38 ML/MIN/1.73M2 — LOW
EOSINOPHIL # BLD AUTO: 0.43 K/UL — SIGNIFICANT CHANGE UP (ref 0–0.5)
EOSINOPHIL NFR BLD AUTO: 5.9 % — SIGNIFICANT CHANGE UP (ref 0–6)
ESTIMATED AVERAGE GLUCOSE: 169 — SIGNIFICANT CHANGE UP
GLUCOSE BLDC GLUCOMTR-MCNC: 139 MG/DL — HIGH (ref 70–99)
GLUCOSE BLDC GLUCOMTR-MCNC: 141 MG/DL — HIGH (ref 70–99)
GLUCOSE SERPL-MCNC: 124 MG/DL — HIGH (ref 70–99)
HCT VFR BLD CALC: 46.6 % — SIGNIFICANT CHANGE UP (ref 39–50)
HDLC SERPL-MCNC: 27 MG/DL — LOW
HGB BLD-MCNC: 15 G/DL — SIGNIFICANT CHANGE UP (ref 13–17)
IANC: 3.95 K/UL — SIGNIFICANT CHANGE UP (ref 1.8–7.4)
IMM GRANULOCYTES NFR BLD AUTO: 0.1 % — SIGNIFICANT CHANGE UP (ref 0–0.9)
LIPID PNL WITH DIRECT LDL SERPL: 60 MG/DL — SIGNIFICANT CHANGE UP
LYMPHOCYTES # BLD AUTO: 2.11 K/UL — SIGNIFICANT CHANGE UP (ref 1–3.3)
LYMPHOCYTES # BLD AUTO: 29.2 % — SIGNIFICANT CHANGE UP (ref 13–44)
MAGNESIUM SERPL-MCNC: 2.2 MG/DL — SIGNIFICANT CHANGE UP (ref 1.6–2.6)
MCHC RBC-ENTMCNC: 26.8 PG — LOW (ref 27–34)
MCHC RBC-ENTMCNC: 32.2 GM/DL — SIGNIFICANT CHANGE UP (ref 32–36)
MCV RBC AUTO: 83.2 FL — SIGNIFICANT CHANGE UP (ref 80–100)
MONOCYTES # BLD AUTO: 0.68 K/UL — SIGNIFICANT CHANGE UP (ref 0–0.9)
MONOCYTES NFR BLD AUTO: 9.4 % — SIGNIFICANT CHANGE UP (ref 2–14)
NEUTROPHILS # BLD AUTO: 3.95 K/UL — SIGNIFICANT CHANGE UP (ref 1.8–7.4)
NEUTROPHILS NFR BLD AUTO: 54.7 % — SIGNIFICANT CHANGE UP (ref 43–77)
NON HDL CHOLESTEROL: 94 MG/DL — SIGNIFICANT CHANGE UP
NRBC # BLD: 0 /100 WBCS — SIGNIFICANT CHANGE UP (ref 0–0)
NRBC # FLD: 0 K/UL — SIGNIFICANT CHANGE UP (ref 0–0)
PHOSPHATE SERPL-MCNC: 4.4 MG/DL — SIGNIFICANT CHANGE UP (ref 2.5–4.5)
PLATELET # BLD AUTO: 148 K/UL — LOW (ref 150–400)
POTASSIUM SERPL-MCNC: 4.9 MMOL/L — SIGNIFICANT CHANGE UP (ref 3.5–5.3)
POTASSIUM SERPL-SCNC: 4.9 MMOL/L — SIGNIFICANT CHANGE UP (ref 3.5–5.3)
PROT SERPL-MCNC: 7.4 G/DL — SIGNIFICANT CHANGE UP (ref 6–8.3)
RBC # BLD: 5.6 M/UL — SIGNIFICANT CHANGE UP (ref 4.2–5.8)
RBC # FLD: 14.6 % — HIGH (ref 10.3–14.5)
SODIUM SERPL-SCNC: 136 MMOL/L — SIGNIFICANT CHANGE UP (ref 135–145)
TRIGL SERPL-MCNC: 172 MG/DL — HIGH
WBC # BLD: 7.23 K/UL — SIGNIFICANT CHANGE UP (ref 3.8–10.5)
WBC # FLD AUTO: 7.23 K/UL — SIGNIFICANT CHANGE UP (ref 3.8–10.5)

## 2024-05-10 RX ORDER — CLOPIDOGREL BISULFATE 75 MG/1
1 TABLET, FILM COATED ORAL
Refills: 0 | DISCHARGE

## 2024-05-10 RX ADMIN — HEPARIN SODIUM 5000 UNIT(S): 5000 INJECTION INTRAVENOUS; SUBCUTANEOUS at 05:07

## 2024-05-10 RX ADMIN — CARVEDILOL PHOSPHATE 6.25 MILLIGRAM(S): 80 CAPSULE, EXTENDED RELEASE ORAL at 05:07

## 2024-05-10 RX ADMIN — Medication 100 MILLIGRAM(S): at 11:37

## 2024-05-10 RX ADMIN — Medication 81 MILLIGRAM(S): at 11:37

## 2024-05-10 NOTE — DISCHARGE NOTE NURSING/CASE MANAGEMENT/SOCIAL WORK - PATIENT PORTAL LINK FT
You can access the FollowMyHealth Patient Portal offered by [facility] by registering at the following website: [email or web address]. By joining [facility]’s FollowMyHealth portal, you will also be able to view your health information using other applications (apps) compatible with our system.

## 2024-05-10 NOTE — DISCHARGE NOTE PROVIDER - NSDCMRMEDTOKEN_GEN_ALL_CORE_FT
allopurinol 100 mg oral tablet: 1 tab(s) orally once a day  aspirin 81 mg oral delayed release capsule: 1 tab(s) orally once a day  atorvastatin 40 mg oral tablet: 1 tab(s) orally once a day  brimonidine 0.025% ophthalmic solution: 1 drop(s) in each affected eye once a day  carvedilol 6.25 mg oral tablet: 1 tab(s) orally 2 times a day  Cozaar 100 mg oral tablet: 1 tab(s) orally once a day  Jardiance 10 mg oral tablet: 1 tab(s) orally once a day  Kerendia 10 mg oral tablet: 1 tab(s) orally 2 times a day  Lokelma 10 g oral powder for reconstitution: 10 gram(s) orally every other day  timolol hemihydrate 0.5% ophthalmic solution: 1 drop(s) in each affected eye once a day  Trulicity Pen 1.5 mg/0.5 mL subcutaneous solution: 1.5 milligram(s) subcutaneously once a week

## 2024-05-10 NOTE — PROGRESS NOTE ADULT - SUBJECTIVE AND OBJECTIVE BOX
Patient is a 69y old  Male who presents with a chief complaint of chest pain (10 May 2024 14:42)      SUBJECTIVE / OVERNIGHT EVENTS:     MEDICATIONS  (STANDING):  allopurinol 100 milliGRAM(s) Oral daily  aspirin enteric coated 81 milliGRAM(s) Oral daily  atorvastatin 40 milliGRAM(s) Oral at bedtime  carvedilol 6.25 milliGRAM(s) Oral every 12 hours  dextrose 10% Bolus 125 milliLiter(s) IV Bolus once  dextrose 5%. 1000 milliLiter(s) (100 mL/Hr) IV Continuous <Continuous>  dextrose 5%. 1000 milliLiter(s) (50 mL/Hr) IV Continuous <Continuous>  dextrose 50% Injectable 25 Gram(s) IV Push once  dextrose 50% Injectable 12.5 Gram(s) IV Push once  glucagon  Injectable 1 milliGRAM(s) IntraMuscular once  heparin   Injectable 5000 Unit(s) SubCutaneous every 12 hours  insulin lispro (ADMELOG) corrective regimen sliding scale   SubCutaneous three times a day before meals  insulin lispro (ADMELOG) corrective regimen sliding scale   SubCutaneous at bedtime    MEDICATIONS  (PRN):  acetaminophen     Tablet .. 650 milliGRAM(s) Oral every 6 hours PRN Temp greater or equal to 38C (100.4F), Mild Pain (1 - 3)  aluminum hydroxide/magnesium hydroxide/simethicone Suspension 30 milliLiter(s) Oral every 4 hours PRN Dyspepsia  dextrose Oral Gel 15 Gram(s) Oral once PRN Blood Glucose LESS THAN 70 milliGRAM(s)/deciliter  melatonin 3 milliGRAM(s) Oral at bedtime PRN Insomnia  ondansetron Injectable 4 milliGRAM(s) IV Push every 8 hours PRN Nausea and/or Vomiting      CAPILLARY BLOOD GLUCOSE      POCT Blood Glucose.: 141 mg/dL (10 May 2024 11:32)  POCT Blood Glucose.: 139 mg/dL (10 May 2024 07:56)  POCT Blood Glucose.: 132 mg/dL (09 May 2024 20:56)  POCT Blood Glucose.: 109 mg/dL (09 May 2024 16:49)    I&O's Summary    09 May 2024 07:01  -  10 May 2024 07:00  --------------------------------------------------------  IN: 120 mL / OUT: 0 mL / NET: 120 mL    10 May 2024 07:01  -  10 May 2024 15:54  --------------------------------------------------------  IN: 120 mL / OUT: 0 mL / NET: 120 mL      T(C): 36.5 (05-10-24 @ 11:15), Max: 36.7 (05-10-24 @ 05:05)  HR: 80 (05-10-24 @ 11:15) (74 - 80)  BP: 134/79 (05-10-24 @ 11:15) (133/96 - 134/79)  RR: 18 (05-10-24 @ 11:15) (18 - 18)  SpO2: 97% (05-10-24 @ 11:15) (97% - 97%)    PHYSICAL EXAM:  GENERAL: NAD, well-developed  HEAD:  Atraumatic, Normocephalic  EYES: EOMI, PERRLA, conjunctiva and sclera clear  NECK: Supple, No JVD  CHEST/LUNG: Clear to auscultation bilaterally; No wheeze  HEART: Regular rate and rhythm; No murmurs, rubs, or gallops  ABDOMEN: Soft, Nontender, Nondistended; Bowel sounds present  EXTREMITIES:  2+ Peripheral Pulses, No clubbing, cyanosis, or edema  PSYCH: AAOx3  NEUROLOGY: non-focal  SKIN: No rashes or lesions    LABS:                        15.0   7.23  )-----------( 148      ( 10 May 2024 04:43 )             46.6     05-10    136  |  100  |  44<H>  ----------------------------<  124<H>  4.9   |  23  |  1.87<H>    Ca    9.3      10 May 2024 04:43  Phos  4.4     05-10  Mg     2.20     05-10    TPro  7.4  /  Alb  4.1  /  TBili  0.9  /  DBili  x   /  AST  19  /  ALT  17  /  AlkPhos  97  05-10    PT/INR - ( 09 May 2024 13:30 )   PT: 10.9 sec;   INR: 0.97 ratio         PTT - ( 09 May 2024 13:30 )  PTT:33.9 sec      Urinalysis Basic - ( 10 May 2024 04:43 )    Color: x / Appearance: x / SG: x / pH: x  Gluc: 124 mg/dL / Ketone: x  / Bili: x / Urobili: x   Blood: x / Protein: x / Nitrite: x   Leuk Esterase: x / RBC: x / WBC x   Sq Epi: x / Non Sq Epi: x / Bacteria: x        RADIOLOGY & ADDITIONAL TESTS:    Imaging Personally Reviewed:    Consultant(s) Notes Reviewed:      Care Discussed with Consultants/Other Providers:

## 2024-05-10 NOTE — DISCHARGE NOTE PROVIDER - NSDCCPCAREPLAN_GEN_ALL_CORE_FT
PRINCIPAL DISCHARGE DIAGNOSIS  Diagnosis: Chest pain  Assessment and Plan of Treatment: No evidence of acute cardiac event, no arrhythmia noted, please constinue aspirin, plavix was stopped. You should follow up with your cardiologist as scheduled next week for stress test.      SECONDARY DISCHARGE DIAGNOSES  Diagnosis: Type 2 diabetes mellitus  Assessment and Plan of Treatment: Continue low salt, fat and carbohydrate diet, minimize glucose intake.  Exercise daily for at least 30 minutes and weight loss.  Follow up with primary care physician and endocrinologist for routine Hemoglobin A1C checks and management.  Follow up with your ophthalmologist for routine yearly vision exams.    Diagnosis: Essential hypertension  Assessment and Plan of Treatment: Low sodium and fat diet, continue anti-hypertensive medications, and follow up with primary care physician.    Diagnosis: Chronic kidney disease (CKD)  Assessment and Plan of Treatment: Continue blood pressure, cholesterol and diabetic medications. Goal of hemoglobin A1C (HgbA1C) < 7%.  Avoid nephrotoxic drugs such as nonsteroidal anti-inflammatory agents (NSAIDs).   Please follow up with your nephrologist to monitor your kidney function, continue with low protein and potassium diet.

## 2024-05-10 NOTE — PROGRESS NOTE ADULT - PROBLEM SELECTOR PLAN 1
Patient with intermittent episode of left sided chest pain   -EKG showed NSR. Trops stable, ACS is ruled out   -Cardiology consulted,   -NST inpatient vs. outpatient   -C/w telemonitoring  -C/w aspirin     Cards cleared pateint     D/C planning

## 2024-05-10 NOTE — DISCHARGE NOTE PROVIDER - HOSPITAL COURSE
69 year old man with CKD III, R renal mass s/p nephrectomy, HTN, HLD, and DMII presents with chest pain. Pt was seen by cardiology, CP atypical for angina, as it is worse on palpitation and nonexertional, ruled out for ACS and his EKG is nonischemic. Of note pt had recent a stress echo in 2/2024 and a NST in 7/2023 which were negative for ischemia.  Given low risk cardiac features, further ischemic work up may be done in the outpatient setting, Plavix stopped, continue ASA.    Patient to f/u with Dr. Stokes at [facility] for outpatient ischemic work up.  No cardiac objection to discharge planning.
Clear liquid diet order ordered today - no trays received at time of RD visit. Will monitor tolerance to diet advance. Prior to this, pt was NPO this admit with no other diet orders./clear fluid

## 2024-05-10 NOTE — PROGRESS NOTE ADULT - SUBJECTIVE AND OBJECTIVE BOX
Overnight events noted      VITAL:  T(C): , Max: 36.7 (05-10-24 @ 05:05)  T(F): , Max: 98 (05-10-24 @ 05:05)  HR: 80 (05-10-24 @ 11:15)  BP: 134/79 (05-10-24 @ 11:15)  BP(mean): --  RR: 18 (05-10-24 @ 11:15)  SpO2: 97% (05-10-24 @ 11:15)  Wt(kg): --      PHYSICAL EXAM:  Constitutional: NAD, Alert  HEENT: NCAT, DMM  Neck: Supple, No JVD  Respiratory: CTA-b/l  Cardiovascular: RRR s1s2, no m/r/g  Gastrointestinal: BS+, soft, NT/ND  Extremities: No peripheral edema b/l  Neurological: no focal deficits; strength grossly intact  Back: no CVAT b/l  Skin: No rashes, no nevi    LABS:                        15.0   7.23  )-----------( 148      ( 10 May 2024 04:43 )             46.6     Na(136)/K(4.9)/Cl(100)/HCO3(23)/BUN(44)/Cr(1.87)Glu(124)/Ca(9.3)/Mg(2.20)/PO4(4.4)    05-10 @ 04:43  Na(136)/K(5.1)/Cl(99)/HCO3(22)/BUN(45)/Cr(1.96)Glu(133)/Ca(9.1)/Mg(--)/PO4(--)    05-09 @ 13:30      IMPRESSION: 69M w/ HTN, DM2, and CKD, 5/9/24 p/w CP/SOB    (1)Renal - 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 Alexandre from [facility] at patient's discretion      Jay Jay Ruiz MD  [facility]  Office/on call physician: [phone]  Cell (7a-7p): [phone]       patient discharged prior to my seeing them today at hospital.      Jay Jay Ruiz MD  [facility]  Office/on call physician: [phone]  Cell (7a-7p): [phone]

## 2024-05-10 NOTE — CONSULT NOTE ADULT - SUBJECTIVE AND OBJECTIVE BOX
Cardiovascular Disease Initial Evaluation  DATE OF SERVICE: 05-10-24 @ 09:43    CHIEF COMPLAINT: Chest pain    HISTORY OF PRESENT ILLNESS:    This is a 69 year old man with CKD III, R renal mass s/p nephrectomy, HTN, HLD, and DMII who presented to [facility] on 5/9/2024 with chest pain. The pain started 2 weeks ago. It is localized on the left side. It is intermittent and nonradiating. It is not associated with exertion. He denies any fever, chills, nausea, vomiting, abdominal pain, or diarrhea.  In the ED, his vitals were notable for HTN. Labs were notable for GINA. EKG showed NSR. CXR showed clear lungs.      Allergies  No Known Allergies        MEDICATIONS:  aspirin enteric coated 81 milliGRAM(s) Oral daily  carvedilol 6.25 milliGRAM(s) Oral every 12 hours  clopidogrel Tablet 75 milliGRAM(s) Oral daily  heparin   Injectable 5000 Unit(s) SubCutaneous every 12 hours        acetaminophen     Tablet .. 650 milliGRAM(s) Oral every 6 hours PRN  melatonin 3 milliGRAM(s) Oral at bedtime PRN  ondansetron Injectable 4 milliGRAM(s) IV Push every 8 hours PRN    aluminum hydroxide/magnesium hydroxide/simethicone Suspension 30 milliLiter(s) Oral every 4 hours PRN    allopurinol 100 milliGRAM(s) Oral daily  atorvastatin 40 milliGRAM(s) Oral at bedtime  dextrose 50% Injectable 25 Gram(s) IV Push once  dextrose 50% Injectable 12.5 Gram(s) IV Push once  dextrose Oral Gel 15 Gram(s) Oral once PRN  glucagon  Injectable 1 milliGRAM(s) IntraMuscular once  insulin lispro (ADMELOG) corrective regimen sliding scale   SubCutaneous three times a day before meals  insulin lispro (ADMELOG) corrective regimen sliding scale   SubCutaneous at bedtime    dextrose 10% Bolus 125 milliLiter(s) IV Bolus once  dextrose 5%. 1000 milliLiter(s) IV Continuous <Continuous>  dextrose 5%. 1000 milliLiter(s) IV Continuous <Continuous>      PAST MEDICAL & SURGICAL HISTORY:  HTN (hypertension)      HLD (hyperlipidemia)      DM (diabetes mellitus)      Chronic kidney disease, unspecified CKD stage      History of right nephrectomy          FAMILY HISTORY:  No pertinent family history in first degree relatives        SOCIAL HISTORY:    The patient is a nonsmoker       REVIEW OF SYSTEMS:  See HPI, otherwise complete 14 point review of systems negative      PHYSICAL EXAM:  T(C): 36.7 (05-10-24 @ 05:05), Max: 36.7 (05-10-24 @ 05:05)  HR: 74 (05-10-24 @ 05:05) (61 - 74)  BP: 133/96 (05-10-24 @ 05:05) (133/96 - 154/67)  RR: 18 (05-10-24 @ 05:05) (17 - 18)  SpO2: 97% (05-10-24 @ 05:05) (97% - 100%)  Wt(kg): --  I&O's Summary    09 May 2024 07:01  -  10 May 2024 07:00  --------------------------------------------------------  IN: 120 mL / OUT: 0 mL / NET: 120 mL    10 May 2024 07:01  -  10 May 2024 09:43  --------------------------------------------------------  IN: 120 mL / OUT: 0 mL / NET: 120 mL        Appearance: No Acute Distress; resting comfortably  HEENT:  Normal oral mucosa, PERRL, EOMI	  Cardiovascular: Normal S1 S2, No JVD, No murmurs/rubs/gallops  Respiratory: Normal respiratory effort; Lungs clear to auscultation bilaterally  Gastrointestinal:  Soft, Non-tender, + BS	  Skin: No rashes, No ecchymoses, No cyanosis	  Neurologic: Non-focal; no weakness  Extremities: No clubbing, cyanosis or edema  Vascular: Peripheral pulses palpable 2+ bilaterally  Psychiatry: A & O x 3, Mood & affect appropriate    Laboratory Data:	 	    CBC Full  -  ( 10 May 2024 04:43 )  WBC Count : 7.23 K/uL  Hemoglobin : 15.0 g/dL  Hematocrit : 46.6 %  Platelet Count - Automated : 148 K/uL  Mean Cell Volume : 83.2 fL  Mean Cell Hemoglobin : 26.8 pg  Mean Cell Hemoglobin Concentration : 32.2 gm/dL  Auto Neutrophil # : 3.95 K/uL  Auto Lymphocyte # : 2.11 K/uL  Auto Monocyte # : 0.68 K/uL  Auto Eosinophil # : 0.43 K/uL  Auto Basophil # : 0.05 K/uL  Auto Neutrophil % : 54.7 %  Auto Lymphocyte % : 29.2 %  Auto Monocyte % : 9.4 %  Auto Eosinophil % : 5.9 %  Auto Basophil % : 0.7 %    05-10    136  |  100  |  44<H>  ----------------------------<  124<H>  4.9   |  23  |  1.87<H>  05-09    136  |  99  |  45<H>  ----------------------------<  133<H>  5.1   |  22  |  1.96<H>    Ca    9.3      10 May 2024 04:43  Ca    9.1      09 May 2024 13:30  Phos  4.4     05-10  Mg     2.20     05-10    TPro  7.4  /  Alb  4.1  /  TBili  0.9  /  DBili  x   /  AST  19  /  ALT  17  /  AlkPhos  97  05-10  TPro  7.6  /  Alb  4.2  /  TBili  0.9  /  DBili  x   /  AST  23  /  ALT  17  /  AlkPhos  98  05-09        Interpretation of Telemetry: Sinus; no ectopy	    ECG:  	Normal sinus rhythm    Assessment: 69 year old man with CKD III, R renal mass s/p nephrectomy, HTN, HLD, and DMII presents with chest pain.    Plan of Care:    #Chest pain-  Atypical for angina, as it is worse on palpitation and nonexertional.  Mr. Reyna has ruled out for ACS and his EKG is nonischemic.  Of note, he had a stress echo in 2/2024 and a NST in 7/2023 which were negative for ischemia.  Given low risk cardiac features, further ischemic work up may be done in the outpatient setting.  I will stop Plavix.  Continue ASA.     #HTN-  BP mildly elevated, but acceptable.    #Hyperlipidemia-  Statin as ordered    #NIDDM-  Sliding scale.      #ACP (advance care planning)-  Advanced care planning was discussed with the patient, his wife, and son in law Dr. Mukesh Olguin.  Advance care planning forms were discussed.   Cardiac findings were discussed in detail and all questions were answered.   30 additional minutes spent addressing advance care plans.    Patient to f/u with Dr. Stokes at [facility] for outpatient ischemic work up.  No cardiac objection to discharge planning.      High and complex medical decision making in this patient with active comorbidities.     86 minutes spent on total encounter; more than 50% of the visit was spent counseling and/or coordinating care by the attending physician.   	  Michele Lowe MD [facility]  Cardiovascular Diseases  [phone]

## 2024-06-28 PROBLEM — E11.9 TYPE 2 DIABETES MELLITUS WITHOUT COMPLICATIONS: Chronic | Status: ACTIVE | Noted: 2024-05-09

## 2024-06-28 PROBLEM — I10 ESSENTIAL (PRIMARY) HYPERTENSION: Chronic | Status: ACTIVE | Noted: 2024-05-09

## 2024-06-28 PROBLEM — N18.9 CHRONIC KIDNEY DISEASE, UNSPECIFIED: Chronic | Status: ACTIVE | Noted: 2024-05-09

## 2024-06-28 PROBLEM — E78.5 HYPERLIPIDEMIA, UNSPECIFIED: Chronic | Status: ACTIVE | Noted: 2024-05-09

## 2024-07-09 ENCOUNTER — APPOINTMENT (OUTPATIENT)
Dept: UROLOGY | Facility: CLINIC | Age: 69
End: 2024-07-09
Payer: MEDICARE

## 2024-07-09 DIAGNOSIS — N13.8 BENIGN PROSTATIC HYPERPLASIA WITH LOWER URINARY TRACT SYMPMS: ICD-10-CM

## 2024-07-09 DIAGNOSIS — N40.1 BENIGN PROSTATIC HYPERPLASIA WITH LOWER URINARY TRACT SYMPMS: ICD-10-CM

## 2024-07-09 PROCEDURE — G2211 COMPLEX E/M VISIT ADD ON: CPT | Mod: NC,1L

## 2024-07-09 PROCEDURE — 99204 OFFICE O/P NEW MOD 45 MIN: CPT

## 2024-07-10 LAB
APPEARANCE: CLEAR
BACTERIA: NEGATIVE /HPF
BILIRUBIN URINE: NEGATIVE
BLOOD URINE: NEGATIVE
CAST: 0 /LPF
COLOR: YELLOW
EPITHELIAL CELLS: 0 /HPF
GLUCOSE QUALITATIVE U: 500 MG/DL
KETONES URINE: NEGATIVE MG/DL
LEUKOCYTE ESTERASE URINE: NEGATIVE
MICROSCOPIC-UA: NORMAL
NITRITE URINE: NEGATIVE
PH URINE: 6.5
PSA FREE FLD-MCNC: 58 %
PSA SERPL-MCNC: 2.54 NG/ML
RED BLOOD CELLS URINE: 0 /HPF
SPECIFIC GRAVITY URINE: 1.01
UROBILINOGEN URINE: 0.2 MG/DL
WHITE BLOOD CELLS URINE: 0 /HPF

## 2024-10-22 ENCOUNTER — APPOINTMENT (OUTPATIENT)
Dept: OTOLARYNGOLOGY | Facility: CLINIC | Age: 69
End: 2024-10-22
Payer: MEDICARE

## 2024-10-22 VITALS
TEMPERATURE: 98 F | BODY MASS INDEX: 24.63 KG/M2 | HEART RATE: 85 BPM | SYSTOLIC BLOOD PRESSURE: 144 MMHG | DIASTOLIC BLOOD PRESSURE: 82 MMHG | WEIGHT: 162 LBS

## 2024-10-22 DIAGNOSIS — H61.23 IMPACTED CERUMEN, BILATERAL: ICD-10-CM

## 2024-10-22 DIAGNOSIS — H90.3 SENSORINEURAL HEARING LOSS, BILATERAL: ICD-10-CM

## 2024-10-22 PROCEDURE — 92567 TYMPANOMETRY: CPT

## 2024-10-22 PROCEDURE — G0268 REMOVAL OF IMPACTED WAX MD: CPT

## 2024-10-22 PROCEDURE — 92557 COMPREHENSIVE HEARING TEST: CPT

## 2024-10-22 PROCEDURE — 99213 OFFICE O/P EST LOW 20 MIN: CPT | Mod: 25

## 2025-01-14 ENCOUNTER — APPOINTMENT (OUTPATIENT)
Dept: UROLOGY | Facility: CLINIC | Age: 70
End: 2025-01-14
Payer: MEDICARE

## 2025-01-14 DIAGNOSIS — N13.8 BENIGN PROSTATIC HYPERPLASIA WITH LOWER URINARY TRACT SYMPMS: ICD-10-CM

## 2025-01-14 DIAGNOSIS — N40.1 BENIGN PROSTATIC HYPERPLASIA WITH LOWER URINARY TRACT SYMPMS: ICD-10-CM

## 2025-01-14 PROCEDURE — G2211 COMPLEX E/M VISIT ADD ON: CPT

## 2025-01-14 PROCEDURE — 99214 OFFICE O/P EST MOD 30 MIN: CPT

## 2025-01-15 LAB
APPEARANCE: CLEAR
BACTERIA: NEGATIVE /HPF
BILIRUBIN URINE: NEGATIVE
BLOOD URINE: NEGATIVE
CAST: 0 /LPF
COLOR: YELLOW
EPITHELIAL CELLS: 0 /HPF
GLUCOSE QUALITATIVE U: >=1000 MG/DL
KETONES URINE: NEGATIVE MG/DL
LEUKOCYTE ESTERASE URINE: NEGATIVE
MICROSCOPIC-UA: NORMAL
NITRITE URINE: NEGATIVE
PH URINE: 6.5
PROTEIN URINE: 100 MG/DL
PSA FREE FLD-MCNC: 49 %
PSA FREE SERPL-MCNC: 1.64 NG/ML
PSA SERPL-MCNC: 3.37 NG/ML
RED BLOOD CELLS URINE: 2 /HPF
SPECIFIC GRAVITY URINE: 1.02
UROBILINOGEN URINE: 0.2 MG/DL
WHITE BLOOD CELLS URINE: 0 /HPF

## 2025-07-15 ENCOUNTER — APPOINTMENT (OUTPATIENT)
Dept: UROLOGY | Facility: CLINIC | Age: 70
End: 2025-07-15
Payer: MEDICARE

## 2025-07-15 PROCEDURE — G2211 COMPLEX E/M VISIT ADD ON: CPT

## 2025-07-15 PROCEDURE — 99214 OFFICE O/P EST MOD 30 MIN: CPT

## 2025-07-16 LAB
APPEARANCE: CLEAR
BACTERIA: NEGATIVE /HPF
BILIRUBIN URINE: NEGATIVE
BLOOD URINE: NEGATIVE
CAST: 0 /LPF
COLOR: YELLOW
EPITHELIAL CELLS: 1 /HPF
GLUCOSE QUALITATIVE U: >=1000 MG/DL
KETONES URINE: NEGATIVE MG/DL
LEUKOCYTE ESTERASE URINE: NEGATIVE
MICROSCOPIC-UA: NORMAL
NITRITE URINE: NEGATIVE
PH URINE: 6
PROTEIN URINE: 100 MG/DL
PSA FREE FLD-MCNC: 61 %
PSA FREE SERPL-MCNC: 1.29 NG/ML
PSA SERPL-MCNC: 2.12 NG/ML
RED BLOOD CELLS URINE: 0 /HPF
SPECIFIC GRAVITY URINE: 1.02
UROBILINOGEN URINE: 0.2 MG/DL
WHITE BLOOD CELLS URINE: 0 /HPF